# Patient Record
Sex: FEMALE | Race: OTHER
[De-identification: names, ages, dates, MRNs, and addresses within clinical notes are randomized per-mention and may not be internally consistent; named-entity substitution may affect disease eponyms.]

---

## 2018-11-10 ENCOUNTER — HOSPITAL ENCOUNTER (EMERGENCY)
Dept: HOSPITAL 72 - EMR | Age: 33
Discharge: HOME | End: 2018-11-10
Payer: COMMERCIAL

## 2018-11-10 VITALS — DIASTOLIC BLOOD PRESSURE: 61 MMHG | SYSTOLIC BLOOD PRESSURE: 104 MMHG

## 2018-11-10 VITALS — WEIGHT: 184 LBS | BODY MASS INDEX: 29.57 KG/M2 | HEIGHT: 66 IN

## 2018-11-10 DIAGNOSIS — S33.5XXA: ICD-10-CM

## 2018-11-10 DIAGNOSIS — S16.1XXA: Primary | ICD-10-CM
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
## 2018-11-10 DIAGNOSIS — M79.622: ICD-10-CM

## 2018-11-10 DIAGNOSIS — M79.652: ICD-10-CM
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
## 2018-11-10 DIAGNOSIS — M79.651: ICD-10-CM

## 2018-11-10 DIAGNOSIS — M25.512: ICD-10-CM

## 2018-11-10 DIAGNOSIS — E03.9: ICD-10-CM

## 2018-11-10 PROCEDURE — 72040 X-RAY EXAM NECK SPINE 2-3 VW: CPT

## 2018-11-10 PROCEDURE — 72020 X-RAY EXAM OF SPINE 1 VIEW: CPT

## 2018-11-10 PROCEDURE — 99284 EMERGENCY DEPT VISIT MOD MDM: CPT
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
